# Patient Record
Sex: FEMALE | Race: WHITE | NOT HISPANIC OR LATINO | Employment: FULL TIME | URBAN - METROPOLITAN AREA
[De-identification: names, ages, dates, MRNs, and addresses within clinical notes are randomized per-mention and may not be internally consistent; named-entity substitution may affect disease eponyms.]

---

## 2019-11-10 ENCOUNTER — HOSPITAL ENCOUNTER (EMERGENCY)
Facility: HOSPITAL | Age: 29
Discharge: HOME | End: 2019-11-10
Attending: EMERGENCY MEDICINE

## 2019-11-10 VITALS
DIASTOLIC BLOOD PRESSURE: 68 MMHG | SYSTOLIC BLOOD PRESSURE: 123 MMHG | HEIGHT: 59 IN | WEIGHT: 175 LBS | RESPIRATION RATE: 16 BRPM | OXYGEN SATURATION: 98 % | HEART RATE: 86 BPM | BODY MASS INDEX: 35.28 KG/M2 | TEMPERATURE: 98 F

## 2019-11-10 DIAGNOSIS — M79.604 MUSCULOSKELETAL PAIN OF LOWER EXTREMITY, RIGHT: ICD-10-CM

## 2019-11-10 DIAGNOSIS — M79.601 MUSCULOSKELETAL PAIN OF UPPER EXTREMITY, RIGHT: ICD-10-CM
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
## 2019-11-10 DIAGNOSIS — R52 PAIN: ICD-10-CM

## 2019-11-10 LAB
B-HCG UR QL: NEGATIVE
CTP QC/QA: YES

## 2019-11-10 PROCEDURE — 81025 URINE PREGNANCY TEST: CPT | Performed by: PHYSICIAN ASSISTANT

## 2019-11-10 PROCEDURE — 99283 EMERGENCY DEPT VISIT LOW MDM: CPT | Mod: 25

## 2019-11-10 RX ORDER — CYCLOBENZAPRINE HCL 10 MG
10 TABLET ORAL 3 TIMES DAILY PRN
Qty: 30 TABLET | Refills: 0 | Status: SHIPPED | OUTPATIENT
Start: 2019-11-10 | End: 2019-11-20

## 2019-11-10 NOTE — ED PROVIDER NOTES
Encounter Date: 11/10/2019       History     Chief Complaint   Patient presents with    Arm Pain     right arm, right leg, right buttock, low back pain for 2 months after fall     29-year-old female, who endorses musculoskeletal pains after fall in late August.  Patient states that she fell coming down some steps approximately 2-2, 1/2 months ago.  Symptom onset was late August.  Has pain her right shoulder and arm as well as her lower back.  With radicular symptoms into the right lower extremity        Review of patient's allergies indicates:   Allergen Reactions    Aleve [naproxen sodium]      Facial swelling     History reviewed. No pertinent past medical history.  History reviewed. No pertinent surgical history.  History reviewed. No pertinent family history.  Social History     Tobacco Use    Smoking status: Current Every Day Smoker    Tobacco comment: vap   Substance Use Topics    Alcohol use: Not on file    Drug use: Not on file     Review of Systems   Constitutional: Negative for chills and fever.   HENT: Negative for congestion, rhinorrhea and sore throat.    Eyes: Negative for discharge and redness.   Respiratory: Negative for cough and shortness of breath.    Cardiovascular: Negative for chest pain.   Gastrointestinal: Negative for abdominal pain.   Musculoskeletal: Positive for arthralgias and back pain. Negative for joint swelling.   Skin: Negative for rash and wound.   Neurological: Negative for weakness.   Psychiatric/Behavioral: The patient is not nervous/anxious.    All other systems reviewed and are negative.      Physical Exam     Initial Vitals [11/10/19 1449]   BP Pulse Resp Temp SpO2   123/68 86 16 98.1 °F (36.7 °C) 98 %      MAP       --         Physical Exam    Constitutional: She appears well-developed and well-nourished.   HENT:   Head: Normocephalic and atraumatic.   Eyes: EOM are normal. Pupils are equal, round, and reactive to light.   Neck: Normal range of motion.    Pulmonary/Chest: No respiratory distress.   Musculoskeletal: Normal range of motion.        Lumbar back: She exhibits tenderness. She exhibits normal range of motion, no bony tenderness and no deformity.        Back:    Neurological: She is alert and oriented to person, place, and time. She has normal strength and normal reflexes. She displays normal reflexes. GCS score is 15. GCS eye subscore is 4. GCS verbal subscore is 5. GCS motor subscore is 6.   Reflex Scores:       Tricep reflexes are 2+ on the right side and 2+ on the left side.       Bicep reflexes are 2+ on the right side and 2+ on the left side.       Patellar reflexes are 2+ on the right side and 2+ on the left side.       Achilles reflexes are 2+ on the left side.  Skin: Skin is warm and dry.   Psychiatric: She has a normal mood and affect. Her behavior is normal.         ED Course   Procedures  Labs Reviewed   POCT URINE PREGNANCY          Imaging Results    None          Medical Decision Making:   Initial Assessment:   NAD  Differential Diagnosis:   The patient's differential diagnoses includes but is not limited to musculoskeletal pain, lumbar radiculopathy, cervical radiculopathy,  Clinical Tests:   Lab Tests: Ordered  Radiological Study: Ordered  ED Management:  A 21-year-old female with 2 months of musculoskeletal pain after a fall down several steps.  Normal musculoskeletal and neurologic physical exam is.  Low suspicion for emergent abnormality.  Plain film x-rays are pending, will recommend outpatient follow-up with Orthopedics, primary care follow-up routinely.  Other:   I have discussed this case with another health care provider.       <> Summary of the Discussion: The patient's emergency department presentation, clinical course, pertinent findings of the physical exam as well as workup were discussed with the attending physician.  Plan of care was reviewed.                   ED Course as of Nov 10 1516   Sun Nov 10, 2019   1514 Preg Test,  Ur: Negative [BF]      ED Course User Index  [BF] AZAEL Blanco                Clinical Impression:       ICD-10-CM ICD-9-CM   1. Fall (on) (from) other stairs and steps, initial encounter W10.8XXA E880.9   2. Pain R52 780.96   3. Musculoskeletal pain of upper extremity, right M79.601 729.5   4. Musculoskeletal pain of lower extremity, right M79.604 729.5                             AZAEL Blanco  11/10/19 1531

## 2019-11-10 NOTE — ED NOTES
Pt fell and bruised her back a couple of months ago and pain has not gone away and now right arm and right leg hurts